# Patient Record
Sex: MALE | Race: WHITE | NOT HISPANIC OR LATINO | URBAN - METROPOLITAN AREA
[De-identification: names, ages, dates, MRNs, and addresses within clinical notes are randomized per-mention and may not be internally consistent; named-entity substitution may affect disease eponyms.]

---

## 2017-06-07 ENCOUNTER — OUTPATIENT (OUTPATIENT)
Dept: OUTPATIENT SERVICES | Facility: HOSPITAL | Age: 72
LOS: 1 days | Discharge: HOME | End: 2017-06-07

## 2017-06-28 DIAGNOSIS — I11.9 HYPERTENSIVE HEART DISEASE WITHOUT HEART FAILURE: ICD-10-CM

## 2017-06-28 DIAGNOSIS — E78.2 MIXED HYPERLIPIDEMIA: ICD-10-CM

## 2017-09-25 ENCOUNTER — OUTPATIENT (OUTPATIENT)
Dept: OUTPATIENT SERVICES | Facility: HOSPITAL | Age: 72
LOS: 1 days | Discharge: HOME | End: 2017-09-25

## 2017-09-26 DIAGNOSIS — I11.9 HYPERTENSIVE HEART DISEASE WITHOUT HEART FAILURE: ICD-10-CM

## 2017-09-26 DIAGNOSIS — E11.9 TYPE 2 DIABETES MELLITUS WITHOUT COMPLICATIONS: ICD-10-CM

## 2017-09-26 DIAGNOSIS — R10.11 RIGHT UPPER QUADRANT PAIN: ICD-10-CM

## 2018-02-22 ENCOUNTER — OUTPATIENT (OUTPATIENT)
Dept: OUTPATIENT SERVICES | Facility: HOSPITAL | Age: 73
LOS: 1 days | Discharge: HOME | End: 2018-02-22

## 2018-02-23 DIAGNOSIS — E11.9 TYPE 2 DIABETES MELLITUS WITHOUT COMPLICATIONS: ICD-10-CM

## 2018-02-23 DIAGNOSIS — E78.2 MIXED HYPERLIPIDEMIA: ICD-10-CM

## 2018-02-23 DIAGNOSIS — I11.9 HYPERTENSIVE HEART DISEASE WITHOUT HEART FAILURE: ICD-10-CM

## 2019-06-27 ENCOUNTER — OUTPATIENT (OUTPATIENT)
Dept: OUTPATIENT SERVICES | Facility: HOSPITAL | Age: 74
LOS: 1 days | Discharge: HOME | End: 2019-06-27

## 2019-06-28 DIAGNOSIS — E78.2 MIXED HYPERLIPIDEMIA: ICD-10-CM

## 2019-06-28 DIAGNOSIS — I11.9 HYPERTENSIVE HEART DISEASE WITHOUT HEART FAILURE: ICD-10-CM

## 2019-06-28 DIAGNOSIS — R10.11 RIGHT UPPER QUADRANT PAIN: ICD-10-CM

## 2024-03-11 NOTE — ASU PATIENT PROFILE, ADULT - NSICDXPASTMEDICALHX_GEN_ALL_CORE_FT
PAST MEDICAL HISTORY:  Acid reflux     Arthritis lower back    COPD, mild     Hyperlipidemia     Sciatica legs    Spinal stenosis     Type 2 diabetes mellitus      PAST MEDICAL HISTORY:  Acid reflux     Arthritis lower back    COPD, mild     H/O: psoriasis     Heart attack     History of inguinal hernia     Hyperlipidemia     Sciatica legs    Spinal stenosis     Type 2 diabetes mellitus

## 2024-03-11 NOTE — ASU PATIENT PROFILE, ADULT - REASON FOR ADMISSION, PROFILE
Bilateral upper lid ptosis repair, bilateral lower lid ectropion repair, bilateral lower lid punctoplasty

## 2024-03-11 NOTE — ASU PATIENT PROFILE, ADULT - FALL HARM RISK - HARM RISK INTERVENTIONS

## 2024-03-11 NOTE — ASU PATIENT PROFILE, ADULT - ABILITY TO HEAR (WITH HEARING AID OR HEARING APPLIANCE IF NORMALLY USED):
cane/Adequate: hears normal conversation without difficulty Adequate: hears normal conversation without difficulty

## 2024-03-12 ENCOUNTER — OUTPATIENT (OUTPATIENT)
Dept: OUTPATIENT SERVICES | Facility: HOSPITAL | Age: 79
LOS: 1 days | Discharge: HOME | End: 2024-03-12
Payer: COMMERCIAL

## 2024-03-12 VITALS
SYSTOLIC BLOOD PRESSURE: 155 MMHG | HEART RATE: 64 BPM | OXYGEN SATURATION: 97 % | WEIGHT: 197.75 LBS | HEIGHT: 69 IN | DIASTOLIC BLOOD PRESSURE: 72 MMHG | RESPIRATION RATE: 16 BRPM | TEMPERATURE: 98 F

## 2024-03-12 VITALS
DIASTOLIC BLOOD PRESSURE: 64 MMHG | OXYGEN SATURATION: 97 % | RESPIRATION RATE: 16 BRPM | SYSTOLIC BLOOD PRESSURE: 128 MMHG | HEART RATE: 67 BPM | TEMPERATURE: 97 F

## 2024-03-12 DIAGNOSIS — Z95.5 PRESENCE OF CORONARY ANGIOPLASTY IMPLANT AND GRAFT: Chronic | ICD-10-CM

## 2024-03-12 LAB — GLUCOSE BLDC GLUCOMTR-MCNC: 139 MG/DL — HIGH (ref 70–99)

## 2024-03-12 PROCEDURE — 88302 TISSUE EXAM BY PATHOLOGIST: CPT | Mod: 26

## 2024-03-12 RX ORDER — MEMANTINE HYDROCHLORIDE 10 MG/1
1 TABLET ORAL
Refills: 0 | DISCHARGE

## 2024-03-12 RX ORDER — FENTANYL CITRATE 50 UG/ML
25 INJECTION INTRAVENOUS
Refills: 0 | Status: DISCONTINUED | OUTPATIENT
Start: 2024-03-12 | End: 2024-03-12

## 2024-03-12 RX ORDER — ONDANSETRON 8 MG/1
4 TABLET, FILM COATED ORAL ONCE
Refills: 0 | Status: DISCONTINUED | OUTPATIENT
Start: 2024-03-12 | End: 2024-03-12

## 2024-03-12 RX ORDER — ROSUVASTATIN CALCIUM 5 MG/1
1 TABLET ORAL
Refills: 0 | DISCHARGE

## 2024-03-12 RX ORDER — IBUPROFEN 200 MG
1 TABLET ORAL
Refills: 0 | DISCHARGE

## 2024-03-12 RX ORDER — PANTOPRAZOLE SODIUM 20 MG/1
1 TABLET, DELAYED RELEASE ORAL
Refills: 0 | DISCHARGE

## 2024-03-12 RX ORDER — LANOLIN ALCOHOL/MO/W.PET/CERES
1 CREAM (GRAM) TOPICAL
Refills: 0 | DISCHARGE

## 2024-03-12 RX ORDER — FLUTICASONE FUROATE, UMECLIDINIUM BROMIDE AND VILANTEROL TRIFENATATE 200; 62.5; 25 UG/1; UG/1; UG/1
1 POWDER RESPIRATORY (INHALATION)
Refills: 0 | DISCHARGE

## 2024-03-12 RX ORDER — EMPAGLIFLOZIN 10 MG/1
1 TABLET, FILM COATED ORAL
Refills: 0 | DISCHARGE

## 2024-03-12 RX ORDER — ASPIRIN/CALCIUM CARB/MAGNESIUM 324 MG
1 TABLET ORAL
Refills: 0 | DISCHARGE

## 2024-03-12 RX ORDER — MONTELUKAST 4 MG/1
1 TABLET, CHEWABLE ORAL
Refills: 0 | DISCHARGE

## 2024-03-12 RX ORDER — TAMSULOSIN HYDROCHLORIDE 0.4 MG/1
1 CAPSULE ORAL
Refills: 0 | DISCHARGE

## 2024-03-12 RX ORDER — OLMESARTAN MEDOXOMIL 5 MG/1
1 TABLET, FILM COATED ORAL
Refills: 0 | DISCHARGE

## 2024-03-12 RX ORDER — SODIUM CHLORIDE 9 MG/ML
1000 INJECTION, SOLUTION INTRAVENOUS
Refills: 0 | Status: DISCONTINUED | OUTPATIENT
Start: 2024-03-12 | End: 2024-03-12

## 2024-03-12 RX ORDER — ACETAMINOPHEN 500 MG
650 TABLET ORAL EVERY 6 HOURS
Refills: 0 | Status: DISCONTINUED | OUTPATIENT
Start: 2024-03-12 | End: 2024-03-12

## 2024-03-12 RX ORDER — METFORMIN HYDROCHLORIDE 850 MG/1
1 TABLET ORAL
Refills: 0 | DISCHARGE

## 2024-03-12 NOTE — BRIEF OPERATIVE NOTE - NSICDXBRIEFPROCEDURE_GEN_ALL_CORE_FT
PROCEDURES:  Repair, ptosis, bilateral 12-Mar-2024 08:17:11  Elina Villalobos  Repair, ectropion, extensive 12-Mar-2024 08:17:45  Elina Villalobos  Punctoplasty, using 3 snip technique 12-Mar-2024 08:18:17  Elina Villalobos

## 2024-03-12 NOTE — BRIEF OPERATIVE NOTE - NSICDXBRIEFPREOP_GEN_ALL_CORE_FT
PRE-OP DIAGNOSIS:  Ptosis, bilateral 12-Mar-2024 08:17:23  Elina Villalobos  Ectropion 12-Mar-2024 08:18:02  Elina Villalobos  Punctal stenosis, acquired, bilateral 12-Mar-2024 08:18:43  Elina Villalobos

## 2024-03-12 NOTE — ASU DISCHARGE PLAN (ADULT/PEDIATRIC) - NS MD DC FALL RISK RISK
For information on Fall & Injury Prevention, visit: [email or web address] OR  [email or web address] OR  https://www.cdc.gov/steadi/patient.html

## 2024-03-12 NOTE — OPERATIVE REPORT - NSICDXBRIEFPOSTOP_GEN_ALL_CORE_FT
POST-OP DIAGNOSIS:  Ptosis, bilateral 12-Mar-2024 08:24:31  Elina Villalobos  Bilateral eye ectropion 12-Mar-2024 08:24:38  Elina Villalobos  Punctal stenosis, acquired, bilateral 12-Mar-2024 08:24:46  Elina Villalobos

## 2024-03-12 NOTE — OPERATIVE REPORT - OPERATIVE RPOSRT DETAILS
The patient was brought to the operating room and the planned area of excision was marked with a marking pen. A drop of tetracaine was administered in both eyes. Local anesthesia was infiltrated into the operative area. A corneal shield with ophthalmic ointment was placed in both eyes.    Attention was turned to the right upper lid. The upper lid skin muscle ellipses were incised with a #15 blade and excised with Elisha scissors. Hemostasis was maintained with bipolar cautery. Dissection was carried down inferior to bare the anterior face of tarsus. Dissection was then carried out superiorly in a sub-orbicularis plane to reveal the orbital septum. The septum was opened, and a plane was dissected under the pre-aponeurotic fat. A double armed 6-0 silk was placed in a lamellar fashion through the anterior border of tarsus, and both arms were passed in a mattress fashion through the levator muscle to advance the eyelid. The height and contour were adjusted until adequate. *** silk sutures were used. Attention was turned to the left side where an identical procedure was performed using *** silk sutures. The patient was brought to the operating room and the planned area of excision was marked with a marking pen. A drop of tetracaine was administered in both eyes. Local anesthesia was infiltrated into the operative area. A corneal shield with ophthalmic ointment was placed in both eyes.    Attention was turned to the right upper lid. The upper lid skin muscle ellipses were incised with a #15 blade and excised with Elisha scissors. Hemostasis was maintained with bipolar cautery. Dissection was carried down inferior to bare the anterior face of tarsus. Dissection was then carried out superiorly in a sub-orbicularis plane to reveal the orbital septum. The septum was opened, and a plane was dissected under the pre-aponeurotic fat. A double armed 6-0 silk was placed in a lamellar fashion through the anterior border of tarsus, and both arms were passed in a mattress fashion through the levator muscle to advance the eyelid. The height and contour were adjusted until adequate. One silk sutures were used. Attention was turned to the left side where an identical procedure was performed using one silk sutures. The overlying skin was closed with 6-0 interrupted nylon sutures.    Attention was then turned to the right lower eyelid where a lateral canthotomy was performed with a #15 blade and a cantholysis was performed with Elisha scissors. The appropriate length of lower lid resection was marked, and the anterior lamella and mucocutaneous junction were removed to fashion a tarsal strip. A double armed 4-0 polydek suture was passed through the tarsal strip and anchored at the interior rim of the lateral wall. A 5-0 PDS suture was placed in a buried fashion to cover the polydek. The canthal angle was reformed with one interrupted 6-0 chromic gut suture, and the overlying skin was closed with 6-0 chromic. An identical procedure was performed on the left side.    Attention was turned to the right lower puncta which was dilated with a punctal dilator. A 3 snip punctoplasty was performed with Elisha scissors. An identical procedure was performed on the left lower puncta.    At the end of the case, the corneal shields were removed, and ointment was applied to both eyes. The patient tolerated the procedure well with no complications.

## 2024-03-12 NOTE — ASU PREOP CHECKLIST - HEART RATE (BEATS/MIN)
NOTIFICATION RETURN TO WORK / SCHOOL 
 
6/23/2019 11:14 PM 
 
Mr. Rina [address] [facility] [id number] To Whom It May Concern: 
 
Rina Carrillo is currently under the care of [facility] EMERGENCY DEPT. He will return to work/school on: 6/25/19 If there are questions or concerns please have the patient contact our office. Sincerely, Olivia Duarte MD 
                                
 

63

## 2024-03-25 LAB — SURGICAL PATHOLOGY STUDY: SIGNIFICANT CHANGE UP

## (undated) DEVICE — DRAPE MAGNETIC INSTRUMENT MEDIUM

## (undated) DEVICE — MARKING PEN DEVON DUAL TIP W RULER

## (undated) DEVICE — SUT PLAIN GUT FAST ABSORBING 5-0 PC-1

## (undated) DEVICE — SYR LUER LOK 5CC

## (undated) DEVICE — APPLICATOR COTTON TIP 6"

## (undated) DEVICE — VENODYNE/SCD SLEEVE CALF MEDIUM

## (undated) DEVICE — Device

## (undated) DEVICE — PACK BLEPHAROPLASTY

## (undated) DEVICE — WARMING BLANKET UPPER ADULT

## (undated) DEVICE — PREP BETADINE SPONGE STICKS

## (undated) DEVICE — NDL RETROBULBAR VISITEC 25X1.5

## (undated) DEVICE — GLV 8 PROTEXIS (WHITE)